# Patient Record
Sex: FEMALE | Race: WHITE | NOT HISPANIC OR LATINO | Employment: FULL TIME | URBAN - NONMETROPOLITAN AREA
[De-identification: names, ages, dates, MRNs, and addresses within clinical notes are randomized per-mention and may not be internally consistent; named-entity substitution may affect disease eponyms.]

---

## 2017-12-27 ENCOUNTER — HOSPITAL ENCOUNTER (EMERGENCY)
Facility: HOSPITAL | Age: 42
Discharge: HOME | End: 2017-12-27
Attending: EMERGENCY MEDICINE | Admitting: EMERGENCY MEDICINE

## 2017-12-27 ENCOUNTER — APPOINTMENT (OUTPATIENT)
Dept: GENERAL RADIOLOGY | Facility: HOSPITAL | Age: 42
End: 2017-12-27

## 2017-12-27 VITALS
HEIGHT: 68 IN | BODY MASS INDEX: 25.76 KG/M2 | OXYGEN SATURATION: 97 % | HEART RATE: 83 BPM | WEIGHT: 170 LBS | TEMPERATURE: 97.9 F | DIASTOLIC BLOOD PRESSURE: 85 MMHG | SYSTOLIC BLOOD PRESSURE: 121 MMHG | RESPIRATION RATE: 18 BRPM

## 2017-12-27 DIAGNOSIS — S63.91XA SPRAIN OF RIGHT HAND, INITIAL ENCOUNTER: Primary | ICD-10-CM

## 2017-12-27 DIAGNOSIS — S63.501A WRIST SPRAIN, RIGHT, INITIAL ENCOUNTER: ICD-10-CM

## 2017-12-27 PROCEDURE — 73110 X-RAY EXAM OF WRIST: CPT

## 2017-12-27 PROCEDURE — 99283 EMERGENCY DEPT VISIT LOW MDM: CPT

## 2017-12-27 PROCEDURE — 73130 X-RAY EXAM OF HAND: CPT

## 2017-12-27 PROCEDURE — 73090 X-RAY EXAM OF FOREARM: CPT

## 2017-12-27 RX ORDER — IBUPROFEN 800 MG/1
800 TABLET ORAL EVERY 8 HOURS PRN
Qty: 90 TABLET | Refills: 0 | Status: SHIPPED | OUTPATIENT
Start: 2017-12-27

## 2017-12-27 RX ORDER — IBUPROFEN 800 MG/1
800 TABLET ORAL ONCE
Status: COMPLETED | OUTPATIENT
Start: 2017-12-27 | End: 2017-12-27

## 2017-12-27 RX ADMIN — IBUPROFEN 800 MG: 800 TABLET, FILM COATED ORAL at 14:05

## 2020-12-23 ENCOUNTER — TRANSCRIBE ORDERS (OUTPATIENT)
Dept: ADMINISTRATIVE | Facility: HOSPITAL | Age: 45
End: 2020-12-23

## 2020-12-23 DIAGNOSIS — Z12.31 VISIT FOR SCREENING MAMMOGRAM: Primary | ICD-10-CM

## 2024-02-18 ENCOUNTER — TELEMEDICINE (OUTPATIENT)
Dept: FAMILY MEDICINE CLINIC | Facility: TELEHEALTH | Age: 49
End: 2024-02-18
Payer: COMMERCIAL

## 2024-02-18 DIAGNOSIS — M79.89 SWOLLEN FINGER: Primary | ICD-10-CM

## 2024-02-18 RX ORDER — IBUPROFEN 800 MG/1
800 TABLET ORAL EVERY 6 HOURS PRN
Qty: 45 TABLET | Refills: 0 | Status: SHIPPED | OUTPATIENT
Start: 2024-02-18

## 2024-02-18 RX ORDER — PREDNISONE 10 MG/1
TABLET ORAL
Qty: 21 TABLET | Refills: 0 | Status: SHIPPED | OUTPATIENT
Start: 2024-02-18

## 2024-02-18 RX ORDER — AMOXICILLIN AND CLAVULANATE POTASSIUM 875; 125 MG/1; MG/1
1 TABLET, FILM COATED ORAL 2 TIMES DAILY
Qty: 20 TABLET | Refills: 0 | Status: SHIPPED | OUTPATIENT
Start: 2024-02-18 | End: 2024-02-28

## 2024-03-04 ENCOUNTER — APPOINTMENT (OUTPATIENT)
Dept: GENERAL RADIOLOGY | Facility: HOSPITAL | Age: 49
End: 2024-03-04
Payer: COMMERCIAL

## 2024-03-04 ENCOUNTER — HOSPITAL ENCOUNTER (EMERGENCY)
Facility: HOSPITAL | Age: 49
Discharge: HOME | End: 2024-03-04
Attending: EMERGENCY MEDICINE | Admitting: EMERGENCY MEDICINE
Payer: COMMERCIAL

## 2024-03-04 VITALS
DIASTOLIC BLOOD PRESSURE: 100 MMHG | SYSTOLIC BLOOD PRESSURE: 133 MMHG | OXYGEN SATURATION: 95 % | BODY MASS INDEX: 36.88 KG/M2 | HEIGHT: 67 IN | TEMPERATURE: 98.1 F | HEART RATE: 87 BPM | RESPIRATION RATE: 17 BRPM | WEIGHT: 235 LBS

## 2024-03-04 DIAGNOSIS — L02.511 ABSCESS OF FINGER OF RIGHT HAND: Primary | ICD-10-CM

## 2024-03-04 PROCEDURE — 99283 EMERGENCY DEPT VISIT LOW MDM: CPT

## 2024-03-04 PROCEDURE — 73130 X-RAY EXAM OF HAND: CPT

## 2024-03-04 PROCEDURE — 63710000001 ONDANSETRON ODT 4 MG TABLET DISPERSIBLE

## 2024-03-04 RX ORDER — HYDROCODONE BITARTRATE AND ACETAMINOPHEN 5; 325 MG/1; MG/1
1 TABLET ORAL ONCE
Status: COMPLETED | OUTPATIENT
Start: 2024-03-04 | End: 2024-03-04

## 2024-03-04 RX ORDER — CEPHALEXIN 500 MG/1
500 CAPSULE ORAL 4 TIMES DAILY
Qty: 28 CAPSULE | Refills: 0 | Status: SHIPPED | OUTPATIENT
Start: 2024-03-04 | End: 2024-03-11

## 2024-03-04 RX ORDER — MELOXICAM 7.5 MG/1
7.5 TABLET ORAL DAILY
Qty: 7 TABLET | Refills: 0 | Status: SHIPPED | OUTPATIENT
Start: 2024-03-04 | End: 2024-03-11

## 2024-03-04 RX ORDER — SULFAMETHOXAZOLE AND TRIMETHOPRIM 800; 160 MG/1; MG/1
2 TABLET ORAL 2 TIMES DAILY
Qty: 28 TABLET | Refills: 0 | Status: SHIPPED | OUTPATIENT
Start: 2024-03-04 | End: 2024-03-07 | Stop reason: SDUPTHER

## 2024-03-04 RX ORDER — ONDANSETRON 4 MG/1
4 TABLET, ORALLY DISINTEGRATING ORAL ONCE
Status: COMPLETED | OUTPATIENT
Start: 2024-03-04 | End: 2024-03-04

## 2024-03-04 RX ADMIN — HYDROCODONE BITARTRATE AND ACETAMINOPHEN 1 TABLET: 5; 325 TABLET ORAL at 17:01

## 2024-03-04 RX ADMIN — ONDANSETRON 4 MG: 4 TABLET, ORALLY DISINTEGRATING ORAL at 17:01

## 2024-03-04 NOTE — ED PROVIDER NOTES
EMERGENCY DEPARTMENT ENCOUNTER    Pt Name: Sarina Bush  MRN: [id number]  Pt :   1975  Room Number:  01SF01  Date of encounter:  3/4/2024  PCP: Sarina Betancur APRN  ED Provider: Sulaiman Farley PA-C    Historian: Patient      HPI:  Chief Complaint:         Context: Sarina Bsuh is a 49 y.o. female who presents to the ED c/o pain and swelling in her right fifth digit.  Patient states for the last 2 weeks she has had a swollen area in the DIP joint of the right fifth digit that seems to be worsening.  She took a 10-day course of Augmentin which did not seem to help.  Patient denies any injury to the right hand.      PAST MEDICAL HISTORY  History reviewed. No pertinent past medical history.      PAST SURGICAL HISTORY  Past Surgical History:   Procedure Laterality Date    BACK SURGERY      discectomy    CHOLECYSTECTOMY      TUBAL ABDOMINAL LIGATION           FAMILY HISTORY  History reviewed. No pertinent family history.      SOCIAL HISTORY  Social History     Socioeconomic History    Marital status:    Tobacco Use    Smoking status: Every Day     Current packs/day: 0.50     Types: Cigarettes   Substance and Sexual Activity    Alcohol use: No    Drug use: Yes     Types: Marijuana         ALLERGIES  Codeine and Morphine and related        REVIEW OF SYSTEMS  Review of Systems   Skin:         Right finger swelling and redness          All systems reviewed and negative except for those discussed in HPI.       PHYSICAL EXAM    I have reviewed the triage vital signs and nursing notes.    ED Triage Vitals [24 1636]   Temp Heart Rate Resp BP SpO2   98.1 °F (36.7 °C) 87 17 133/100 95 %      Temp src Heart Rate Source Patient Position BP Location FiO2 (%)   Oral Monitor Sitting Left arm --       Physical Exam  Vitals and nursing note reviewed.   Constitutional:       General: She is not in acute distress.     Appearance: She is not ill-appearing, toxic-appearing or diaphoretic.   HENT:       Head: Normocephalic and atraumatic.      Mouth/Throat:      Mouth: Mucous membranes are moist.      Pharynx: Oropharynx is clear.   Eyes:      Extraocular Movements: Extraocular movements intact.   Cardiovascular:      Rate and Rhythm: Normal rate.      Heart sounds: Normal heart sounds.   Pulmonary:      Effort: Pulmonary effort is normal. No respiratory distress.      Breath sounds: Normal breath sounds.   Abdominal:      Tenderness: There is no abdominal tenderness.   Skin:     General: Skin is warm and dry.      Findings: No rash.          Neurological:      Mental Status: She is alert.           LAB RESULTS  No results found for this or any previous visit (from the past 24 hour(s)).    If labs were ordered, I independently reviewed the results and considered them in treating the patient.        RADIOLOGY  No Radiology Exams Resulted Within Past 24 Hours    I ordered and independently reviewed the above noted radiographic studies.      I viewed images of right hand x-ray which showed soft tissue swelling and concern for osteomyelitis of the DIP joint of the right fifth digit per my independent interpretation.    See radiologist's dictation for official interpretation.        PROCEDURES    Incision & Drainage    Date/Time: 3/4/2024 6:46 PM    Performed by: Sulaiman Farley PA-C  Authorized by: Dustin Funes DO    Consent:     Consent obtained:  Verbal    Consent given by:  Patient    Risks, benefits, and alternatives were discussed: yes      Risks discussed:  Bleeding, incomplete drainage, infection and pain    Alternatives discussed:  No treatment  Universal protocol:     Procedure explained and questions answered to patient or proxy's satisfaction: yes      Imaging studies available: yes      Patient identity confirmed:  Arm band  Location:     Type:  Abscess    Size:  2.6    Location:  Upper extremity    Upper extremity location:  Finger    Finger location:  R small finger  Pre-procedure  details:     Skin preparation:  Chlorhexidine with alcohol  Anesthesia:     Anesthesia method:  Local infiltration and nerve block    Local anesthetic:  Lidocaine 1% w/o epi    Block technique:  Ring block  Procedure type:     Complexity:  Simple  Procedure details:     Needle aspiration: yes      Needle size:  18 G    Incision types:  Stab incision    Incision depth:  Subcutaneous    Drainage:  Bloody and purulent    Drainage amount:  Scant    Wound treatment:  Wound left open  Post-procedure details:     Procedure completion:  Tolerated well, no immediate complications      No orders to display       MEDICATIONS GIVEN IN ER    Medications   ondansetron ODT (ZOFRAN-ODT) disintegrating tablet 4 mg (4 mg Oral Given 3/4/24 1701)   HYDROcodone-acetaminophen (NORCO) 5-325 MG per tablet 1 tablet (1 tablet Oral Given 3/4/24 1701)         MEDICAL DECISION MAKING, PROGRESS, and CONSULTS    All labs, if obtained, have been independently reviewed by me.  All radiology studies, if obtained, have been reviewed by me and the radiologist dictating the report.  All EKG's, if obtained, have been independently viewed and interpreted by me/my attending physician.      Discussion below represents my analysis of pertinent findings related to patient's condition, differential diagnosis, treatment plan and final disposition.    I personally reviewed the x-ray of the right hand and showed concern for soft tissue swelling with possible osteomyelitis of the DIP joint of the right fifth digit.  I discussed with ED attending Dr. Parry who recommended an I&D procedure and outpatient follow-up with orthopedic hand specialist within 24 to 48 hours.  I performed a incision and drainage procedure at bedside after performing a ring block of the right fifth digit.  A small amount of purulent drainage and some bloody drainage was expressed.  I then contacted hospital pharmacist at [facility] who recommended outpatient treatment for  osteo being Bactrim double strength 2 tablets twice daily for 7 days along with Keflex 500 mg 4 times daily for 7 days.  I arranged for patient to contact either of the orthopedic hand specialist available through the [employer] system she states she will call them tomorrow and try to follow-up within 24 to 48 hours.  Advised if she cannot follow-up with them to return to the ER for recheck within 48 hours.                     Differential diagnosis:    Differential diagnosis included was not limited to abscess, cellulitis, foreign body, osteomyelitis      Additional sources:    - Discussed/ obtained information from independent historians: Not applicable    - External (non-ED) record review: None    - Chronic or social conditions impacting care: None    - Shared decision making: Discussed risk versus benefit of admission for IV antibiotics versus outpatient follow-up and through shared decision making we agreed on the plan that she will take oral antibiotics and follow-up within 48 hours with hand specialist      Orders placed during this visit:  Orders Placed This Encounter   Procedures    XR Hand 3+ View Right         Additional orders considered but not ordered:  None    ED Course:    Consultants: Discussed with ED attending who agrees with plan of care                Shared Decision Making:  After my consideration of clinical presentation and any laboratory/radiology studies obtained, I discussed the findings with the patient/patient representative who is in agreement with the treatment plan and the final disposition.   Risks and benefits of discharge and/or observation/admission were discussed.       AS OF 18:45 EST VITALS:    BP - 133/100  HR - 87  TEMP - 98.1 °F (36.7 °C) (Oral)  O2 SATS - 95%                  DIAGNOSIS  Final diagnoses:   Abscess of finger of right hand         DISPOSITION  Discharge home      Please note that portions of this document were completed with voice recognition software.         Sulaiman Farley PA-C  03/04/24 1849

## 2024-03-04 NOTE — Clinical Note
[facility] EMERGENCY DEPARTMENT  [address] [address]  Phone: [phone]    Sarina Bush was seen and treated in our emergency department on 3/4/2024.  She may return to work on 03/08/2024.         Thank you for choosing [facility].    Sulaiman Farley PA-C

## 2024-03-04 NOTE — DISCHARGE INSTRUCTIONS
Please follow-up with orthopedic hand specialist soon as possible for further evaluation ideally within the next 24 to 48 hours.  Return to the ER for any acute changes or worsening of your condition.

## 2024-03-07 ENCOUNTER — LAB (OUTPATIENT)
Dept: LAB | Facility: HOSPITAL | Age: 49
End: 2024-03-07
Payer: COMMERCIAL

## 2024-03-07 ENCOUNTER — OFFICE VISIT (OUTPATIENT)
Age: 49
End: 2024-03-07
Payer: COMMERCIAL

## 2024-03-07 VITALS
BODY MASS INDEX: 36.53 KG/M2 | SYSTOLIC BLOOD PRESSURE: 140 MMHG | HEIGHT: 68 IN | DIASTOLIC BLOOD PRESSURE: 90 MMHG | WEIGHT: 241 LBS

## 2024-03-07 DIAGNOSIS — M00.9: ICD-10-CM

## 2024-03-07 DIAGNOSIS — M00.9: Primary | ICD-10-CM

## 2024-03-07 PROCEDURE — 87147 CULTURE TYPE IMMUNOLOGIC: CPT

## 2024-03-07 PROCEDURE — 87070 CULTURE OTHR SPECIMN AEROBIC: CPT

## 2024-03-07 PROCEDURE — 87186 SC STD MICRODIL/AGAR DIL: CPT

## 2024-03-07 PROCEDURE — 87205 SMEAR GRAM STAIN: CPT

## 2024-03-07 RX ORDER — SULFAMETHOXAZOLE AND TRIMETHOPRIM 800; 160 MG/1; MG/1
2 TABLET ORAL 2 TIMES DAILY
Qty: 28 TABLET | Refills: 0 | Status: SHIPPED | OUTPATIENT
Start: 2024-03-07 | End: 2024-03-14

## 2024-03-07 NOTE — PROGRESS NOTES
[facility] Orthopedic     Office Visit       Date: 03/07/2024   Patient Name: Sarina Bush  MRN: [id number]  YOB: 1975    Referring Physician: No ref. provider found     Chief Complaint:   Chief Complaint   Patient presents with   • Right Hand - Pain       History of Present Illness:   Sarina Bush is a 49 y.o. female dominant presents with right small finger pain of 3 weeks duration.  Patient denies inciting trauma.  She reports she is suddenly noticed increased pain and swelling of her right ankle DIP.  She reports slight tried to jeanna it at home and then presented to the ED on 3/4/2024.  She underwent bedside I&D at that time however they did not send culture.  She has been on Keflex and Bactrim since then with minimal improvement in her symptoms.  She reports pain with motion of the DIP.  Otherwise no pain proximally in the hand or finger.  Denies fever chills nausea or vomiting.  She has been doing soaks at home with some drainage from her dorsal DIP wound she is otherwise healthy.  She does have a history of nonspecific arthritis with morning arthralgias however has never been diagnosed with rheumatoid.      Subjective   Review of Systems:   Review of Systems   Constitutional: Negative.    HENT: Negative.     Eyes: Negative.    Respiratory: Negative.     Cardiovascular: Negative.    Gastrointestinal: Negative.    Endocrine: Negative.    Genitourinary: Negative.    Musculoskeletal:  Positive for arthralgias.   Skin: Negative.    Allergic/Immunologic: Negative.    Neurological: Negative.    Hematological: Negative.    Psychiatric/Behavioral: Negative.          Pertinent review of systems per HPI.     I reviewed the patient's chief complaint, history of present illness, review of systems, past medical history, surgical history, family history, social history, medications and allergy list in the EMR on 03/07/2024 and  "agree with the findings above.    Objective    Vital Signs:   Vitals:    03/07/24 1030   BP: 140/90   Weight: 109 kg (241 lb)   Height: 172.7 cm (68\")     BMI: Class 2 Severe Obesity (BMI >=35 and <=39.9). Obesity-related health conditions include the following: none. Obesity is unchanged. BMI is is above average; no BMI management plan is appropriate. We discussed portion control and increasing exercise.       General Appearance: No acute distress. Alert and oriented.     Chest:  Non-labored breathing on room air. Regular rate and rhythm.    Right upper Extremity Exam:    No palpable masses or visible lesions  Fingers are warm, well-perfused with appropriate capillary refill.  Palpable radial pulse.    Sensation intact to light touch in median, radial and ulnar nerve distributions.    Motor- Fires FPL, ulnar intrinsics, EPL/EDC w/ full active and passive range of motion. Strength intact.    Non-tender except for in the areas highlighted below    Right small finger DIP swollen and held in a partially flexed position.  There is localized erythema to the DIP.  He is tender to palpation.  There is a dorsal wound without underlying fluctuance.  There is pinpoint drainage after soaking the finger.  There is no cellulitis proximal to the DIP.  No tenderness along the flexor sheath.  Patient has pain with motion at the DIP but is otherwise nontender flexion extension of all digits of the hand.    Imaging/Studies:   Imaging Results (Last 24 Hours)       ** No results found for the last 24 hours. **            X-ray of the right hand from 3/4/2024 was independently reviewed and interpreted by myself.  There is clear arthritis with hook osteophytes about the DIP digits of the hand.  Pain attention to the small finger DIP in particular, there is soft tissue swelling as well as some osseous destruction and demineralization at the DIP joint concerning for septic arthritis.    Procedures:  Procedures    Quality Measures:   ACP: "   ACP discussion was declined by the patient, Patient does not have an advance directive, declines further assistance.    Tobacco:   Sarina Bush  reports that she has been smoking cigarettes. She started smoking about 29 years ago. She has a 14.9 pack-year smoking history. She has never used smokeless tobacco.      Assessment / Plan    Assessment/Plan:     There are no diagnoses linked to this encounter.     Sarina Bushis a 49 y.o. female who presents with:      ICD-10-CM ICD-9-CM   1. Septic arthritis of interphalangeal joint of finger  M00.9 711.04       Patient presents with 3-week history of right small finger DIP pain and swelling and exam concerning for septic arthritis of the DIP joint.  She previously underwent I&D with minimal expression of purulence.  She does have some scant drainage with soaks which were sent for culture today.  I will put in a referral for infectious disease w/ Dr. Chance for medical management of her right small finger DIP septic arthritis.  If she fails medical management she may later require amputation of the small finger.  Recommend patient continue antibiotics until then.  Recommend 3 times daily warm water soaks to encourage drainage.  I will also put in orders for CBC, BMP, ESR, CRP and wound culture.  Recommend patient follow-up with me in 2 weeks.    Follow Up:   Return in about 2 weeks (around 3/21/2024).        Taco Humphreys MD  [facility] Hand and Upper Extremity Surgeon

## 2024-03-08 ENCOUNTER — LAB (OUTPATIENT)
Dept: LAB | Facility: HOSPITAL | Age: 49
End: 2024-03-08
Payer: COMMERCIAL

## 2024-03-08 DIAGNOSIS — M00.9: ICD-10-CM

## 2024-03-08 LAB
ANION GAP SERPL CALCULATED.3IONS-SCNC: 11.2 MMOL/L (ref 5–15)
BASOPHILS # BLD AUTO: 0.05 10*3/MM3 (ref 0–0.2)
BASOPHILS NFR BLD AUTO: 0.6 % (ref 0–1.5)
BUN SERPL-MCNC: 14 MG/DL (ref 6–20)
BUN/CREAT SERPL: 17.7 (ref 7–25)
CALCIUM SPEC-SCNC: 8.9 MG/DL (ref 8.6–10.5)
CHLORIDE SERPL-SCNC: 102 MMOL/L (ref 98–107)
CO2 SERPL-SCNC: 22.8 MMOL/L (ref 22–29)
CREAT SERPL-MCNC: 0.79 MG/DL (ref 0.57–1)
CRP SERPL-MCNC: 0.81 MG/DL (ref 0–0.5)
DEPRECATED RDW RBC AUTO: 43 FL (ref 37–54)
EGFRCR SERPLBLD CKD-EPI 2021: 92.4 ML/MIN/1.73
EOSINOPHIL # BLD AUTO: 0.18 10*3/MM3 (ref 0–0.4)
EOSINOPHIL NFR BLD AUTO: 2.2 % (ref 0.3–6.2)
ERYTHROCYTE [DISTWIDTH] IN BLOOD BY AUTOMATED COUNT: 13.1 % (ref 12.3–15.4)
ERYTHROCYTE [SEDIMENTATION RATE] IN BLOOD: 9 MM/HR (ref 0–20)
GLUCOSE SERPL-MCNC: 98 MG/DL (ref 65–99)
HCT VFR BLD AUTO: 39 % (ref 34–46.6)
HGB BLD-MCNC: 13 G/DL (ref 12–15.9)
IMM GRANULOCYTES # BLD AUTO: 0.02 10*3/MM3 (ref 0–0.05)
IMM GRANULOCYTES NFR BLD AUTO: 0.2 % (ref 0–0.5)
LYMPHOCYTES # BLD AUTO: 1.93 10*3/MM3 (ref 0.7–3.1)
LYMPHOCYTES NFR BLD AUTO: 23.4 % (ref 19.6–45.3)
MCH RBC QN AUTO: 29.5 PG (ref 26.6–33)
MCHC RBC AUTO-ENTMCNC: 33.3 G/DL (ref 31.5–35.7)
MCV RBC AUTO: 88.4 FL (ref 79–97)
MONOCYTES # BLD AUTO: 0.69 10*3/MM3 (ref 0.1–0.9)
MONOCYTES NFR BLD AUTO: 8.4 % (ref 5–12)
NEUTROPHILS NFR BLD AUTO: 5.39 10*3/MM3 (ref 1.7–7)
NEUTROPHILS NFR BLD AUTO: 65.2 % (ref 42.7–76)
NRBC BLD AUTO-RTO: 0 /100 WBC (ref 0–0.2)
PLATELET # BLD AUTO: 281 10*3/MM3 (ref 140–450)
PMV BLD AUTO: 11.6 FL (ref 6–12)
POTASSIUM SERPL-SCNC: 4.3 MMOL/L (ref 3.5–5.2)
RBC # BLD AUTO: 4.41 10*6/MM3 (ref 3.77–5.28)
SODIUM SERPL-SCNC: 136 MMOL/L (ref 136–145)
WBC NRBC COR # BLD AUTO: 8.26 10*3/MM3 (ref 3.4–10.8)

## 2024-03-08 PROCEDURE — 86140 C-REACTIVE PROTEIN: CPT

## 2024-03-08 PROCEDURE — 85025 COMPLETE CBC W/AUTO DIFF WBC: CPT

## 2024-03-08 PROCEDURE — 36415 COLL VENOUS BLD VENIPUNCTURE: CPT

## 2024-03-08 PROCEDURE — 85652 RBC SED RATE AUTOMATED: CPT

## 2024-03-08 PROCEDURE — 80048 BASIC METABOLIC PNL TOTAL CA: CPT

## 2024-03-10 LAB
BACTERIA SPEC AEROBE CULT: ABNORMAL
GRAM STN SPEC: ABNORMAL
GRAM STN SPEC: ABNORMAL

## 2024-03-11 ENCOUNTER — TELEPHONE (OUTPATIENT)
Age: 49
End: 2024-03-11
Payer: COMMERCIAL

## 2024-03-11 NOTE — TELEPHONE ENCOUNTER
Received call from Jolly with  lab with critical result from pt's wound culture; Positive for MRSA; Relayed info to Dr. Humphreys.    Didi LANCE CMA ([facility]), ROT

## 2024-03-13 ENCOUNTER — TELEPHONE (OUTPATIENT)
Dept: ORTHOPEDIC SURGERY | Facility: CLINIC | Age: 49
End: 2024-03-13

## 2024-03-13 RX ORDER — KETOROLAC TROMETHAMINE 10 MG/1
10 TABLET, FILM COATED ORAL EVERY 6 HOURS PRN
Qty: 30 TABLET | Refills: 0 | Status: SHIPPED | OUTPATIENT
Start: 2024-03-13 | End: 2024-03-18

## 2024-03-13 NOTE — TELEPHONE ENCOUNTER
Provider: RAÚL    Caller: SUSIE    Relationship to Patient: SELF    Pharmacy: [facility]    Phone Number: [phone]    Reason for Call: PT CALLING TO SEE IF SHE CAN GET SOME KIND OF MEDICATION FOR THE PAIN SHE IS IN, MAX NOT WANT A NARCOTIC BUT SOMETHING STRONGER THAN WHAT SHE GOT LAST TIME

## 2024-03-21 ENCOUNTER — OFFICE VISIT (OUTPATIENT)
Age: 49
End: 2024-03-21
Payer: COMMERCIAL

## 2024-03-21 VITALS
SYSTOLIC BLOOD PRESSURE: 120 MMHG | HEIGHT: 68 IN | BODY MASS INDEX: 36.53 KG/M2 | WEIGHT: 241 LBS | DIASTOLIC BLOOD PRESSURE: 82 MMHG

## 2024-03-21 DIAGNOSIS — M00.9: Primary | ICD-10-CM

## 2024-03-21 PROCEDURE — 1160F RVW MEDS BY RX/DR IN RCRD: CPT | Performed by: PLASTIC SURGERY

## 2024-03-21 PROCEDURE — 1159F MED LIST DOCD IN RCRD: CPT | Performed by: PLASTIC SURGERY

## 2024-03-21 PROCEDURE — 99213 OFFICE O/P EST LOW 20 MIN: CPT | Performed by: PLASTIC SURGERY

## 2024-03-21 RX ORDER — KETOROLAC TROMETHAMINE 10 MG/1
10 TABLET, FILM COATED ORAL EVERY 6 HOURS PRN
COMMUNITY

## 2024-03-21 RX ORDER — SULFAMETHOXAZOLE AND TRIMETHOPRIM 800; 160 MG/1; MG/1
1 TABLET ORAL 2 TIMES DAILY
Qty: 28 TABLET | Refills: 0 | Status: SHIPPED | OUTPATIENT
Start: 2024-03-21 | End: 2024-04-04

## 2024-03-21 NOTE — PROGRESS NOTES
[facility] Orthopedic     Follow-up Office Visit       Date: 03/21/2024   Patient Name: Sarina Bush  MRN: [id number]  YOB: 1975    Chief Complaint:   Chief Complaint   Patient presents with    Follow-up     2 week follow up -- Septic arthritis of interphalangeal joint of finger       History of Present Illness:   Sarina Bush is a 49 y.o. female presents for follow-up of right small finger DIP arthritis.  She reports that since her last visit she has been having ongoing pain at the DIP.  Reports that the erythema and drainage has improved.  She has been taking antibiotics but ran out on antibiotics yesterday.  She has been unable to see an infectious disease specialist with [facility] due to her insurance.  We are attempting to get her follow-up with infectious disease specialist that [facility].  She reports she continues to have severe pain at the DIP.  Denies fevers chills nausea or vomiting.  Wound cultures from 2 weeks ago demonstrate MRSA.      Subjective   Review of Systems:   Review of Systems   Constitutional:  Negative for chills, fever, unexpected weight gain and unexpected weight loss.   HENT:  Negative for congestion, postnasal drip and rhinorrhea.    Eyes:  Negative for blurred vision.   Respiratory:  Negative for shortness of breath.    Cardiovascular:  Negative for leg swelling.   Gastrointestinal:  Negative for abdominal pain, nausea and vomiting.   Genitourinary:  Negative for difficulty urinating.   Musculoskeletal:  Positive for arthralgias. Negative for gait problem, joint swelling and myalgias.   Skin:  Negative for skin lesions and wound.   Neurological:  Negative for dizziness, weakness, light-headedness and numbness.   Hematological:  Does not bruise/bleed easily.   Psychiatric/Behavioral:  Negative for depressed mood.         Pertinent review of systems per  "HPI    I reviewed the patient's chief complaint, history of present illness, review of systems, past medical history, surgical history, family history, social history, medications and allergy list in the EMR on 03/21/2024 and agree with the findings above.    Objective    Vital Signs:   Vitals:    03/21/24 1003   BP: 120/82   Weight: 109 kg (241 lb)   Height: 172.7 cm (67.99\")     BMI: Class 2 Severe Obesity (BMI >=35 and <=39.9). Obesity-related health conditions include the following: none. Obesity is unchanged. BMI is is above average; no BMI management plan is appropriate. We discussed portion control and increasing exercise.       General Appearance: No acute distress. Alert and oriented.     Chest:  Non-labored breathing on room air      Right Upper Extremity:  Right small finger with swelling at the DIP.  Tender to palpation.  There is a dorsal wound with no purulent drainage today.  Erythema is improved from her last visit.  Nontender proximally on the finger.  Nontender over the flexor sheath.  Patient with limited range of motion at the DIP but otherwise intact.  Fingers warm and well-perfused distally  Sensation intact to light touch in the median, radial and ulnar nerve distributions    Imaging/Studies:   Imaging Results (Last 24 Hours)       ** No results found for the last 24 hours. **            No new imaging.    Procedures:  Procedures    Quality Measures:   Quality Measures:   ACP:   ACP discussion was declined by the patient, Patient does not have an advance directive, declines further assistance.    Tobacco:   Sarina Bush  reports that she has been smoking cigarettes. She started smoking about 29 years ago. She has a 14.9 pack-year smoking history. She has never used smokeless tobacco.    Assessment / Plan    Assessment/Plan:      Diagnosis Plan   1. Septic arthritis of interphalangeal joint of finger            Patient presents for follow-up of septic arthritis of right small finger " DIP.  She has evidence of MRSA on culture and has improved somewhat on oral Bactrim but has not been able to see an infectious disease specialist due to her insurance.  We will continue to try to get her follow-up with infectious disease at [facility].  Recommend continue p.o. Bactrim in the meantime.  If symptoms worsen or fail to improve with antibiotic she may eventually require debridement and possible amputation of the DIP however would like to manage medically and preserve the finger if possible.  Recommend patient continue out of work while she has an active infection.    Follow Up:   Return in about 2 weeks (around 4/4/2024).        Taco Humphreys MD  [facility] Hand and Upper Extremity Surgeon

## 2024-04-04 ENCOUNTER — OFFICE VISIT (OUTPATIENT)
Age: 49
End: 2024-04-04
Payer: COMMERCIAL

## 2024-04-04 VITALS
SYSTOLIC BLOOD PRESSURE: 124 MMHG | BODY MASS INDEX: 36.42 KG/M2 | DIASTOLIC BLOOD PRESSURE: 88 MMHG | HEIGHT: 68 IN | WEIGHT: 240.3 LBS

## 2024-04-04 DIAGNOSIS — M00.9: Primary | ICD-10-CM

## 2024-04-04 RX ORDER — SULFAMETHOXAZOLE AND TRIMETHOPRIM 800; 160 MG/1; MG/1
1 TABLET ORAL 2 TIMES DAILY
Qty: 42 TABLET | Refills: 0 | Status: SHIPPED | OUTPATIENT
Start: 2024-04-04 | End: 2024-04-25

## 2024-04-04 NOTE — PROGRESS NOTES
[facility] Orthopedic     Follow-up Office Visit       Date: 04/04/2024   Patient Name: Sarina Bush  MRN: [id number]  YOB: 1975    Chief Complaint:   Chief Complaint   Patient presents with    Follow-up     2 week follow up- Septic arthritis of interphalangeal joint of finger       History of Present Illness:   Sarina Bush is a 49 y.o. female presents for follow-up of septic arthritis of the right small finger.  She still has not been seen by infectious disease but does have an appointment with  infectious disease on April 24.  She reports that her right small finger pain and swelling has improved somewhat since her last visit.  She has continued to take Bactrim.  She denies fevers chills nausea or vomiting.  No new concerns.    Subjective   Review of Systems:   Review of Systems   Constitutional: Negative.  Negative for chills, fatigue and fever.   HENT: Negative.  Negative for congestion and dental problem.    Eyes: Negative.  Negative for blurred vision.   Respiratory: Negative.  Negative for shortness of breath.    Cardiovascular: Negative.  Negative for leg swelling.   Gastrointestinal: Negative.  Negative for abdominal pain.   Endocrine: Negative.  Negative for polyuria.   Genitourinary: Negative.  Negative for difficulty urinating.   Musculoskeletal:  Positive for arthralgias.   Skin: Negative.    Allergic/Immunologic: Negative.    Neurological: Negative.    Hematological: Negative.  Negative for adenopathy.   Psychiatric/Behavioral: Negative.  Negative for behavioral problems.         Pertinent review of systems per HPI    I reviewed the patient's chief complaint, history of present illness, review of systems, past medical history, surgical history, family history, social history, medications and allergy list in the EMR on 04/04/2024 and agree with the findings above.    Objective    Vital Signs:   Vitals:  "   04/04/24 [facility]   BP: 124/88   Weight: 109 kg (240 lb 4.8 oz)   Height: 172.7 cm (67.99\")     BMI: Class 2 Severe Obesity (BMI >=35 and <=39.9). Obesity-related health conditions include the following: none. Obesity is unchanged. BMI is is above average; no BMI management plan is appropriate. We discussed portion control and increasing exercise.       General Appearance: No acute distress. Alert and oriented.     Chest:  Non-labored breathing on room air      Right Upper Extremity:  Swelling of the right small finger DIP with the joint held in flexion.  Minimal active extensive at the joint.  Patient does have some flexion with only mild pain.  No erythema, fluctuance or induration.  She remains tender palpation over the joint however improved from prior visits.  No tenderness proximal to the DIP.  No evidence of felon or flexor tenosynovitis.  Patient has both flexion and extension of the rest of the finger without pain  Fingers warm and well-perfused distally  Sensation intact to light touch in the median, radial and ulnar nerve distributions    Imaging/Studies:   Imaging Results (Last 24 Hours)       ** No results found for the last 24 hours. **            No new imaging    Procedures:  Procedures    Quality Measures:   Quality Measures:   ACP:   ACP discussion was declined by the patient, Patient does not have an advance directive, declines further assistance.    Tobacco:   Sarina Bush  reports that she has been smoking cigarettes. She started smoking about 29 years ago. She has a 14.9 pack-year smoking history. She has been exposed to tobacco smoke. She has never used smokeless tobacco.    Assessment / Plan    Assessment/Plan:      Diagnosis Plan   1. Septic arthritis of interphalangeal joint of finger  CBC & Differential    Basic Metabolic Panel    C-reactive Protein    Sedimentation Rate    XR Finger 2+ View Right          Patient presents for follow-up of left small finger DIP MRSA septic " arthritis.  We still have not been able to get her established with infectious disease physician however she does have an appointment with  ID on April 24.  Her exam is continuing to improve slowly with p.o. Bactrim and she has no evidence of proximal spread of the infection.  Recommend repeat x-rays as well as labs including CRP ESR and CBC to evaluate her response to treatment.  Recommend continue Bactrim until appointment with infectious disease.  At that time they will determine whether she needs further IV antibiotics.  If she fails nonoperative management with antibiotics we will consider potentially debridement with masquelet technique however at this time we will continue with the antibiotics.  Follow-up with me on [id number] after her infectious disease appointment.    Follow Up:   Return in about 4 weeks (around 5/2/2024).        Taco Humphreys MD  [facility]remity Surgeon

## 2024-04-09 ENCOUNTER — HOSPITAL ENCOUNTER (OUTPATIENT)
Dept: GENERAL RADIOLOGY | Facility: HOSPITAL | Age: 49
Discharge: HOME | End: 2024-04-09
Payer: COMMERCIAL

## 2024-04-09 ENCOUNTER — LAB (OUTPATIENT)
Dept: LAB | Facility: HOSPITAL | Age: 49
End: 2024-04-09
Payer: COMMERCIAL

## 2024-04-09 DIAGNOSIS — M00.9: ICD-10-CM

## 2024-04-09 LAB
BASOPHILS # BLD AUTO: 0.05 10*3/MM3 (ref 0–0.2)
BASOPHILS NFR BLD AUTO: 0.6 % (ref 0–1.5)
DEPRECATED RDW RBC AUTO: 42.9 FL (ref 37–54)
EOSINOPHIL # BLD AUTO: 0.19 10*3/MM3 (ref 0–0.4)
EOSINOPHIL NFR BLD AUTO: 2.3 % (ref 0.3–6.2)
ERYTHROCYTE [DISTWIDTH] IN BLOOD BY AUTOMATED COUNT: 13.4 % (ref 12.3–15.4)
HCT VFR BLD AUTO: 40.9 % (ref 34–46.6)
HGB BLD-MCNC: 13.4 G/DL (ref 12–15.9)
IMM GRANULOCYTES # BLD AUTO: 0.01 10*3/MM3 (ref 0–0.05)
IMM GRANULOCYTES NFR BLD AUTO: 0.1 % (ref 0–0.5)
LYMPHOCYTES # BLD AUTO: 1.91 10*3/MM3 (ref 0.7–3.1)
LYMPHOCYTES NFR BLD AUTO: 22.8 % (ref 19.6–45.3)
MCH RBC QN AUTO: 28.8 PG (ref 26.6–33)
MCHC RBC AUTO-ENTMCNC: 32.8 G/DL (ref 31.5–35.7)
MCV RBC AUTO: 88 FL (ref 79–97)
MONOCYTES # BLD AUTO: 0.61 10*3/MM3 (ref 0.1–0.9)
MONOCYTES NFR BLD AUTO: 7.3 % (ref 5–12)
NEUTROPHILS NFR BLD AUTO: 5.6 10*3/MM3 (ref 1.7–7)
NEUTROPHILS NFR BLD AUTO: 66.9 % (ref 42.7–76)
NRBC BLD AUTO-RTO: 0 /100 WBC (ref 0–0.2)
PLATELET # BLD AUTO: 294 10*3/MM3 (ref 140–450)
PMV BLD AUTO: 11.1 FL (ref 6–12)
RBC # BLD AUTO: 4.65 10*6/MM3 (ref 3.77–5.28)
WBC NRBC COR # BLD AUTO: 8.37 10*3/MM3 (ref 3.4–10.8)

## 2024-04-09 PROCEDURE — 80048 BASIC METABOLIC PNL TOTAL CA: CPT

## 2024-04-09 PROCEDURE — 86140 C-REACTIVE PROTEIN: CPT

## 2024-04-09 PROCEDURE — 73140 X-RAY EXAM OF FINGER(S): CPT

## 2024-04-09 PROCEDURE — 85652 RBC SED RATE AUTOMATED: CPT

## 2024-04-09 PROCEDURE — 85025 COMPLETE CBC W/AUTO DIFF WBC: CPT

## 2024-04-10 LAB
ANION GAP SERPL CALCULATED.3IONS-SCNC: 12.7 MMOL/L (ref 5–15)
BUN SERPL-MCNC: 10 MG/DL (ref 6–20)
BUN/CREAT SERPL: 14.7 (ref 7–25)
CALCIUM SPEC-SCNC: 9 MG/DL (ref 8.6–10.5)
CHLORIDE SERPL-SCNC: 106 MMOL/L (ref 98–107)
CO2 SERPL-SCNC: 18.3 MMOL/L (ref 22–29)
CREAT SERPL-MCNC: 0.68 MG/DL (ref 0.57–1)
CRP SERPL-MCNC: 1.21 MG/DL (ref 0–0.5)
EGFRCR SERPLBLD CKD-EPI 2021: 107.6 ML/MIN/1.73
ERYTHROCYTE [SEDIMENTATION RATE] IN BLOOD: 26 MM/HR (ref 0–20)
GLUCOSE SERPL-MCNC: 113 MG/DL (ref 65–99)
POTASSIUM SERPL-SCNC: 4.5 MMOL/L (ref 3.5–5.2)
SODIUM SERPL-SCNC: 137 MMOL/L (ref 136–145)

## 2024-04-24 ENCOUNTER — OFFICE VISIT (OUTPATIENT)
Dept: ORTHOPEDIC SURGERY | Facility: CLINIC | Age: 49
End: 2024-04-24
Payer: COMMERCIAL

## 2024-04-24 VITALS
BODY MASS INDEX: 38.46 KG/M2 | WEIGHT: 253.8 LBS | DIASTOLIC BLOOD PRESSURE: 84 MMHG | SYSTOLIC BLOOD PRESSURE: 116 MMHG | HEIGHT: 68 IN

## 2024-04-24 DIAGNOSIS — M00.9: Primary | ICD-10-CM

## 2024-04-24 PROCEDURE — 99213 OFFICE O/P EST LOW 20 MIN: CPT | Performed by: PLASTIC SURGERY

## 2024-04-24 PROCEDURE — 1160F RVW MEDS BY RX/DR IN RCRD: CPT | Performed by: PLASTIC SURGERY

## 2024-04-24 PROCEDURE — 1159F MED LIST DOCD IN RCRD: CPT | Performed by: PLASTIC SURGERY

## 2024-04-24 NOTE — PROGRESS NOTES
[facility]     Follow-up Office Visit       Date: 04/24/2024   Patient Name: Sarina Bush  MRN: [id number]  YOB: 1975    Chief Complaint:   Chief Complaint   Patient presents with    Follow-up     2 week f/u -- Septic arthritis of interphalangeal joint of finger       History of Present Illness:   Sarina Bush is a 49 y.o. female presents for follow-up of septic arthritis of the DIP joint of the right small finger.  Ports her pain is somewhat improved her last visit but she still has pain at the small finger.  She was finally able to see [facility] today recommend discontinuing antibiotics and getting an MRI of the right small finger.  She denies fever chills nausea or vomiting.  Denies drainage from the small finger.  No other concerns.      Subjective   Review of Systems:   Review of Systems   Constitutional:  Negative for chills, fever, unexpected weight gain and unexpected weight loss.   HENT:  Negative for congestion, postnasal drip and rhinorrhea.    Eyes:  Negative for blurred vision.   Respiratory:  Negative for shortness of breath.    Cardiovascular:  Negative for leg swelling.   Gastrointestinal:  Negative for abdominal pain, nausea and vomiting.   Genitourinary:  Negative for difficulty urinating.   Musculoskeletal:  Positive for arthralgias. Negative for gait problem, joint swelling and myalgias.   Skin:  Negative for skin lesions and wound.   Neurological:  Negative for dizziness, weakness, light-headedness and numbness.   Hematological:  Does not bruise/bleed easily.   Psychiatric/Behavioral:  Negative for depressed mood.         Pertinent review of systems per HPI    I reviewed the patient's chief complaint, history of present illness, review of systems, past medical history, surgical history, family history, social history, medications and allergy list in the EMR on 04/24/2024  "and agree with the findings above.    Objective    Vital Signs:   Vitals:    04/24/24 1046   BP: 116/84   Weight: 115 kg (253 lb 12.8 oz)   Height: 172.7 cm (67.99\")     BMI: Body mass index is 38.6 kg/m².     General Appearance: No acute distress. Alert and oriented.     Chest:  Non-labored breathing on room air      Ortho Exam:  At the DIP of the small finger at the joint held in flexion.  No active extension of the joint.  Only mild tender to palpation over the small finger DIP.  Patient has full flexion of the hand.  No tenderness along the flexor tendon sheath.  No drainage or fluctuance associated with the DIP.  Fingers warm and well-perfused distally  Sensation intact to light touch in the median, radial and ulnar nerve distributions    Imaging/Studies:   Imaging Results (Last 24 Hours)       ** No results found for the last 24 hours. **            New imaging.    Procedures:  Procedures    Quality Measures:   ACP:   ACP discussion was deferred.    Tobacco:   Sarina Bush  reports that she has been smoking cigarettes. She started smoking about 29 years ago. She has a 15 pack-year smoking history. She has been exposed to tobacco smoke. She has never used smokeless tobacco.    Assessment / Plan    Assessment/Plan:      Diagnosis Plan   1. Septic arthritis of interphalangeal joint of finger            Patient presents for follow-up of right small finger DIP septic arthritis has slowly improved with p.o. antibiotics.  She was seen by infectious disease today who recommend trialing off antibiotics and an MRI of the right small finger.  Recommend patient follow-up with me in 3 weeks after MRI and trial off antibiotics.  Recommend strict return precautions if patient develops any increasing pain erythema or drainage.  Patient is in agreement with the plan.    Follow Up:   Return in about 3 weeks (around 5/15/2024).        Taco Humphreys MD  [facility] [place] [facility]  "

## 2024-04-25 ENCOUNTER — TELEPHONE (OUTPATIENT)
Age: 49
End: 2024-04-25
Payer: COMMERCIAL

## 2024-04-25 NOTE — TELEPHONE ENCOUNTER
Patient was finally able to get appointment at  but is unable to compete the MRI before her follow-up appointment with Dr Humphreys on 5/16/24. 's earliest opening to schedule her MRI was 6/10/24 but is on a waiting list in case someone cancels. Patient wanted Dr Humphreys to be aware of the this and did not know if she should cancel f/u with Petr or just wait.

## 2024-04-25 NOTE — TELEPHONE ENCOUNTER
I called patient and told her Dr. Humphreys said it is ok to keep the appointment and she understood.  Judith Street RT (R), ROT

## 2024-05-20 ENCOUNTER — ANESTHESIA EVENT (OUTPATIENT)
Dept: PERIOP | Facility: HOSPITAL | Age: 49
End: 2024-05-20
Payer: COMMERCIAL

## 2024-05-20 ENCOUNTER — OFFICE VISIT (OUTPATIENT)
Age: 49
End: 2024-05-20
Payer: COMMERCIAL

## 2024-05-20 VITALS
SYSTOLIC BLOOD PRESSURE: 130 MMHG | WEIGHT: 253 LBS | BODY MASS INDEX: 38.34 KG/M2 | DIASTOLIC BLOOD PRESSURE: 82 MMHG | HEIGHT: 68 IN

## 2024-05-20 DIAGNOSIS — M00.9: Primary | ICD-10-CM

## 2024-05-20 PROCEDURE — 1160F RVW MEDS BY RX/DR IN RCRD: CPT | Performed by: PLASTIC SURGERY

## 2024-05-20 PROCEDURE — 99214 OFFICE O/P EST MOD 30 MIN: CPT | Performed by: PLASTIC SURGERY

## 2024-05-20 PROCEDURE — 1159F MED LIST DOCD IN RCRD: CPT | Performed by: PLASTIC SURGERY

## 2024-05-20 RX ORDER — SODIUM CHLORIDE 0.9 % (FLUSH) 0.9 %
10 SYRINGE (ML) INJECTION AS NEEDED
Status: CANCELLED | OUTPATIENT
Start: 2024-05-20

## 2024-05-20 RX ORDER — DOXYCYCLINE HYCLATE 100 MG/1
100 CAPSULE ORAL
COMMUNITY
Start: 2024-05-15 | End: 2024-06-26

## 2024-05-20 RX ORDER — SODIUM CHLORIDE 0.9 % (FLUSH) 0.9 %
10 SYRINGE (ML) INJECTION EVERY 12 HOURS SCHEDULED
Status: CANCELLED | OUTPATIENT
Start: 2024-05-20

## 2024-05-20 RX ORDER — FAMOTIDINE 10 MG/ML
20 INJECTION, SOLUTION INTRAVENOUS ONCE
Status: CANCELLED | OUTPATIENT
Start: 2024-05-20 | End: 2024-05-20

## 2024-05-20 RX ORDER — SODIUM CHLORIDE 9 MG/ML
40 INJECTION, SOLUTION INTRAVENOUS AS NEEDED
Status: CANCELLED | OUTPATIENT
Start: 2024-05-20

## 2024-05-20 NOTE — H&P (VIEW-ONLY)
[facility]     Follow-up Office Visit       Date: 05/20/2024   Patient Name: Sarina Bush  MRN: [id number]  YOB: 1975    Chief Complaint:   Chief Complaint   Patient presents with    Follow-up     4 week follow up -- Septic arthritis of interphalangeal joint of finger       History of Present Illness:   Sarina Bush is a 49 y.o. female here for follow-up of right small finger DIP septic arthritis.  She has been following with [facility].  They trialed taking her off antibiotics and she developed increasing pain and swelling of the right small finger.  She was placed back on doxycycline approximately 5 days ago and recently received an MRI of the right small finger.  She reports her pain and swelling has improved slightly since being on the doxycycline however worsened while off antibiotics.  She now has pain in the entirety of her right small finger but is most prevalent at the DIP.  Reports pain with flexion of the small finger.  Denies fever chills nausea or vomiting.  No other concerns.      Subjective   Review of Systems:   Review of Systems   Constitutional:  Negative for chills, fever, unexpected weight gain and unexpected weight loss.   HENT:  Negative for congestion, postnasal drip and rhinorrhea.    Eyes:  Negative for blurred vision.   Respiratory:  Negative for shortness of breath.    Cardiovascular:  Negative for leg swelling.   Gastrointestinal:  Negative for abdominal pain, nausea and vomiting.   Genitourinary:  Negative for difficulty urinating.   Musculoskeletal:  Positive for arthralgias. Negative for gait problem, joint swelling and myalgias.   Skin:  Negative for skin lesions and wound.   Neurological:  Negative for dizziness, weakness, light-headedness and numbness.   Hematological:  Does not bruise/bleed easily.   Psychiatric/Behavioral:  Negative for depressed mood.      "    Pertinent review of systems per HPI    I reviewed the patient's chief complaint, history of present illness, review of systems, past medical history, surgical history, family history, social history, medications and allergy list in the EMR on 05/20/2024 and agree with the findings above.    Objective    Vital Signs:   Vitals:    05/20/24 1340   BP: 130/82   Weight: 115 kg (253 lb)   Height: 172.7 cm (67.99\")     BMI: Body mass index is 38.48 kg/m².     General Appearance: No acute distress. Alert and oriented.     Chest:  Non-labored breathing on room air      Ortho Exam:  Right small finger slightly swollen compared to prior exam.  Patient manage tender palpation of the DIP as well as mild tender to the PIP and MCP.  She does have some tenderness along the flexor sheath although is able to passively extend the small finger with minimal pain.  Mild erythema to small finger.  No tenderness in the hand or wrist.  Fingers warm and well-perfused distally  Sensation intact to light touch in the median, radial and ulnar nerve distributions    Imaging/Studies:   Imaging Results (Last 24 Hours)       ** No results found for the last 24 hours. **            MRI of the right small finger was independently viewed and interpreted by myself and demonstrate evidence of septic arthritis of the right small finger DIP as well as inflammation of the middle and distal phalanx with concern for early osteomyelitis.    Procedures:  Procedures    Quality Measures:   ACP:   ACP discussion was deferred.    Tobacco:   Sarina Bush  reports that she has been smoking cigarettes. She started smoking about 29 years ago. She has a 15 pack-year smoking history. She has been exposed to tobacco smoke. She has never used smokeless tobacco.    Assessment / Plan    Assessment/Plan:      Diagnosis Plan   1. Septic arthritis of interphalangeal joint of finger  Case Request          Patient presents for follow-up of right small finger DIP " arthritis.  She has not been on antibiotics per infectious disease recs and has now worsening of her exam after a trial off antibiotics.  Given these findings we recommend operative treatment of septic arthritis of the DIP.  Recommend incision and drainage of right small finger DIP, flexor sheath and all indicated procedures.  Will plan to book her for surgery tomorrow.  Repeat cultures of the time of surgery.  Will defer to [facility] with regards to antibiotic management the patient understands the risks and benefits of surgery and would like to proceed.    Consent-incision and drainage of right small finger DIP and flexor sheath, all indicated procedures    The risks and benefits of the procedure were discussed with the patient and or appropriate guardian, which include but are not limited to the risk of bleeding, infection, neurovascular damage, post-operative stiffness, recurrence, tendon and/or ligament retears, recurrent instability, continued pain, arthritic pain, need for further revision surgeries in the future, deep venous thrombosis, and general risks from anesthesia. We also discussed the post-operative rehabilitation, the need for physical therapy, and the overall expected outcomes from the procedure. We also discussed the possible use of biologics including allograft. We allowed proper time and answered the patient's questions regarding the procedure. The patient expressed understanding. Knowing what the risks are and what the conservative treatment is, the patient elected to forgo any further conservative treatment options and proceed with the surgical intervention.      Follow Up:   Return for Follow Up after Post Op.        Taco Humphreys MD  [facility] [place] [facility]

## 2024-05-20 NOTE — PROGRESS NOTES
[facility]     Follow-up Office Visit       Date: 05/20/2024   Patient Name: Sarina Bush  MRN: [id number]  YOB: 1975    Chief Complaint:   Chief Complaint   Patient presents with    Follow-up     4 week follow up -- Septic arthritis of interphalangeal joint of finger       History of Present Illness:   Sarina Bush is a 49 y.o. female here for follow-up of right small finger DIP septic arthritis.  She has been following with [facility].  They trialed taking her off antibiotics and she developed increasing pain and swelling of the right small finger.  She was placed back on doxycycline approximately 5 days ago and recently received an MRI of the right small finger.  She reports her pain and swelling has improved slightly since being on the doxycycline however worsened while off antibiotics.  She now has pain in the entirety of her right small finger but is most prevalent at the DIP.  Reports pain with flexion of the small finger.  Denies fever chills nausea or vomiting.  No other concerns.      Subjective   Review of Systems:   Review of Systems   Constitutional:  Negative for chills, fever, unexpected weight gain and unexpected weight loss.   HENT:  Negative for congestion, postnasal drip and rhinorrhea.    Eyes:  Negative for blurred vision.   Respiratory:  Negative for shortness of breath.    Cardiovascular:  Negative for leg swelling.   Gastrointestinal:  Negative for abdominal pain, nausea and vomiting.   Genitourinary:  Negative for difficulty urinating.   Musculoskeletal:  Positive for arthralgias. Negative for gait problem, joint swelling and myalgias.   Skin:  Negative for skin lesions and wound.   Neurological:  Negative for dizziness, weakness, light-headedness and numbness.   Hematological:  Does not bruise/bleed easily.   Psychiatric/Behavioral:  Negative for depressed mood.      "    Pertinent review of systems per HPI    I reviewed the patient's chief complaint, history of present illness, review of systems, past medical history, surgical history, family history, social history, medications and allergy list in the EMR on 05/20/2024 and agree with the findings above.    Objective    Vital Signs:   Vitals:    05/20/24 1340   BP: 130/82   Weight: 115 kg (253 lb)   Height: 172.7 cm (67.99\")     BMI: Body mass index is 38.48 kg/m².     General Appearance: No acute distress. Alert and oriented.     Chest:  Non-labored breathing on room air      Ortho Exam:  Right small finger slightly swollen compared to prior exam.  Patient manage tender palpation of the DIP as well as mild tender to the PIP and MCP.  She does have some tenderness along the flexor sheath although is able to passively extend the small finger with minimal pain.  Mild erythema to small finger.  No tenderness in the hand or wrist.  Fingers warm and well-perfused distally  Sensation intact to light touch in the median, radial and ulnar nerve distributions    Imaging/Studies:   Imaging Results (Last 24 Hours)       ** No results found for the last 24 hours. **            MRI of the right small finger was independently viewed and interpreted by myself and demonstrate evidence of septic arthritis of the right small finger DIP as well as inflammation of the middle and distal phalanx with concern for early osteomyelitis.    Procedures:  Procedures    Quality Measures:   ACP:   ACP discussion was deferred.    Tobacco:   Sarina Bush  reports that she has been smoking cigarettes. She started smoking about 29 years ago. She has a 15 pack-year smoking history. She has been exposed to tobacco smoke. She has never used smokeless tobacco.    Assessment / Plan    Assessment/Plan:      Diagnosis Plan   1. Septic arthritis of interphalangeal joint of finger  Case Request          Patient presents for follow-up of right small finger DIP " arthritis.  She has not been on antibiotics per infectious disease recs and has now worsening of her exam after a trial off antibiotics.  Given these findings we recommend operative treatment of septic arthritis of the DIP.  Recommend incision and drainage of right small finger DIP, flexor sheath and all indicated procedures.  Will plan to book her for surgery tomorrow.  Repeat cultures of the time of surgery.  Will defer to [facility] with regards to antibiotic management the patient understands the risks and benefits of surgery and would like to proceed.    Consent-incision and drainage of right small finger DIP and flexor sheath, all indicated procedures    The risks and benefits of the procedure were discussed with the patient and or appropriate guardian, which include but are not limited to the risk of bleeding, infection, neurovascular damage, post-operative stiffness, recurrence, tendon and/or ligament retears, recurrent instability, continued pain, arthritic pain, need for further revision surgeries in the future, deep venous thrombosis, and general risks from anesthesia. We also discussed the post-operative rehabilitation, the need for physical therapy, and the overall expected outcomes from the procedure. We also discussed the possible use of biologics including allograft. We allowed proper time and answered the patient's questions regarding the procedure. The patient expressed understanding. Knowing what the risks are and what the conservative treatment is, the patient elected to forgo any further conservative treatment options and proceed with the surgical intervention.      Follow Up:   Return for Follow Up after Post Op.        Taco Humphreys MD  [facility] [place] [facility]

## 2024-05-21 ENCOUNTER — ANESTHESIA (OUTPATIENT)
Dept: PERIOP | Facility: HOSPITAL | Age: 49
End: 2024-05-21
Payer: COMMERCIAL

## 2024-05-21 ENCOUNTER — HOSPITAL ENCOUNTER (OUTPATIENT)
Facility: HOSPITAL | Age: 49
Setting detail: HOSPITAL OUTPATIENT SURGERY
Discharge: HOME | End: 2024-05-21
Attending: PLASTIC SURGERY | Admitting: PLASTIC SURGERY
Payer: COMMERCIAL

## 2024-05-21 VITALS
TEMPERATURE: 98.2 F | WEIGHT: 253 LBS | HEART RATE: 85 BPM | BODY MASS INDEX: 39.71 KG/M2 | RESPIRATION RATE: 18 BRPM | HEIGHT: 67 IN | SYSTOLIC BLOOD PRESSURE: 148 MMHG | OXYGEN SATURATION: 93 % | DIASTOLIC BLOOD PRESSURE: 81 MMHG

## 2024-05-21 DIAGNOSIS — M00.9: Primary | ICD-10-CM

## 2024-05-21 PROCEDURE — 25010000002 LIDOCAINE 1 % SOLUTION 20 ML VIAL: Performed by: PLASTIC SURGERY

## 2024-05-21 PROCEDURE — 26080 EXPLORE/TREAT FINGER JOINT: CPT | Performed by: PLASTIC SURGERY

## 2024-05-21 PROCEDURE — 87176 TISSUE HOMOGENIZATION CULTR: CPT | Performed by: PLASTIC SURGERY

## 2024-05-21 PROCEDURE — 25010000002 PROPOFOL 10 MG/ML EMULSION: Performed by: ANESTHESIOLOGY

## 2024-05-21 PROCEDURE — 87102 FUNGUS ISOLATION CULTURE: CPT | Performed by: PLASTIC SURGERY

## 2024-05-21 PROCEDURE — 25010000002 ONDANSETRON PER 1 MG: Performed by: ANESTHESIOLOGY

## 2024-05-21 PROCEDURE — 87070 CULTURE OTHR SPECIMN AEROBIC: CPT | Performed by: PLASTIC SURGERY

## 2024-05-21 PROCEDURE — 87075 CULTR BACTERIA EXCEPT BLOOD: CPT | Performed by: PLASTIC SURGERY

## 2024-05-21 PROCEDURE — 87206 SMEAR FLUORESCENT/ACID STAI: CPT | Performed by: PLASTIC SURGERY

## 2024-05-21 PROCEDURE — 25010000002 VANCOMYCIN: Performed by: PLASTIC SURGERY

## 2024-05-21 PROCEDURE — 25010000002 DEXAMETHASONE PER 1 MG: Performed by: ANESTHESIOLOGY

## 2024-05-21 PROCEDURE — 87015 SPECIMEN INFECT AGNT CONCNTJ: CPT | Performed by: PLASTIC SURGERY

## 2024-05-21 PROCEDURE — 25010000002 BUPIVACAINE (PF) 0.25 % SOLUTION 10 ML VIAL: Performed by: PLASTIC SURGERY

## 2024-05-21 PROCEDURE — 87205 SMEAR GRAM STAIN: CPT | Performed by: PLASTIC SURGERY

## 2024-05-21 PROCEDURE — 87116 MYCOBACTERIA CULTURE: CPT | Performed by: PLASTIC SURGERY

## 2024-05-21 PROCEDURE — 25810000003 LACTATED RINGERS PER 1000 ML: Performed by: ANESTHESIOLOGY

## 2024-05-21 PROCEDURE — 25010000002 DROPERIDOL PER 5 MG

## 2024-05-21 PROCEDURE — 25010000002 FENTANYL CITRATE (PF) 100 MCG/2ML SOLUTION: Performed by: ANESTHESIOLOGY

## 2024-05-21 RX ORDER — OXYCODONE HYDROCHLORIDE 5 MG/1
5 TABLET ORAL EVERY 6 HOURS PRN
Qty: 20 TABLET | Refills: 0 | Status: SHIPPED | OUTPATIENT
Start: 2024-05-21 | End: 2024-06-03

## 2024-05-21 RX ORDER — ONDANSETRON 2 MG/ML
INJECTION INTRAMUSCULAR; INTRAVENOUS AS NEEDED
Status: DISCONTINUED | OUTPATIENT
Start: 2024-05-21 | End: 2024-05-21 | Stop reason: SURG

## 2024-05-21 RX ORDER — OXYCODONE HYDROCHLORIDE 5 MG/1
TABLET ORAL
Status: COMPLETED
Start: 2024-05-21 | End: 2024-05-21

## 2024-05-21 RX ORDER — FENTANYL CITRATE 50 UG/ML
INJECTION, SOLUTION INTRAMUSCULAR; INTRAVENOUS AS NEEDED
Status: DISCONTINUED | OUTPATIENT
Start: 2024-05-21 | End: 2024-05-21 | Stop reason: SURG

## 2024-05-21 RX ORDER — DROPERIDOL 2.5 MG/ML
0.62 INJECTION, SOLUTION INTRAMUSCULAR; INTRAVENOUS ONCE AS NEEDED
Status: COMPLETED | OUTPATIENT
Start: 2024-05-21 | End: 2024-05-21

## 2024-05-21 RX ORDER — PROPOFOL 10 MG/ML
VIAL (ML) INTRAVENOUS AS NEEDED
Status: DISCONTINUED | OUTPATIENT
Start: 2024-05-21 | End: 2024-05-21 | Stop reason: SURG

## 2024-05-21 RX ORDER — KETOROLAC TROMETHAMINE 10 MG/1
10 TABLET, FILM COATED ORAL EVERY 6 HOURS PRN
Qty: 30 TABLET | Refills: 0 | Status: SHIPPED | OUTPATIENT
Start: 2024-05-21

## 2024-05-21 RX ORDER — FAMOTIDINE 20 MG/1
20 TABLET, FILM COATED ORAL ONCE
Status: COMPLETED | OUTPATIENT
Start: 2024-05-21 | End: 2024-05-21

## 2024-05-21 RX ORDER — FENTANYL CITRATE 50 UG/ML
50 INJECTION, SOLUTION INTRAMUSCULAR; INTRAVENOUS
Status: DISCONTINUED | OUTPATIENT
Start: 2024-05-21 | End: 2024-05-21 | Stop reason: HOSPADM

## 2024-05-21 RX ORDER — DEXAMETHASONE SODIUM PHOSPHATE 10 MG/ML
INJECTION INTRAMUSCULAR; INTRAVENOUS AS NEEDED
Status: DISCONTINUED | OUTPATIENT
Start: 2024-05-21 | End: 2024-05-21 | Stop reason: SURG

## 2024-05-21 RX ORDER — SODIUM CHLORIDE, SODIUM LACTATE, POTASSIUM CHLORIDE, CALCIUM CHLORIDE 600; 310; 30; 20 MG/100ML; MG/100ML; MG/100ML; MG/100ML
9 INJECTION, SOLUTION INTRAVENOUS CONTINUOUS
Status: DISCONTINUED | OUTPATIENT
Start: 2024-05-21 | End: 2024-05-21 | Stop reason: HOSPADM

## 2024-05-21 RX ORDER — DROPERIDOL 2.5 MG/ML
INJECTION, SOLUTION INTRAMUSCULAR; INTRAVENOUS
Status: COMPLETED
Start: 2024-05-21 | End: 2024-05-21

## 2024-05-21 RX ORDER — MAGNESIUM HYDROXIDE 1200 MG/15ML
LIQUID ORAL AS NEEDED
Status: DISCONTINUED | OUTPATIENT
Start: 2024-05-21 | End: 2024-05-21 | Stop reason: HOSPADM

## 2024-05-21 RX ORDER — MIDAZOLAM HYDROCHLORIDE 1 MG/ML
1 INJECTION INTRAMUSCULAR; INTRAVENOUS
Status: DISCONTINUED | OUTPATIENT
Start: 2024-05-21 | End: 2024-05-21 | Stop reason: HOSPADM

## 2024-05-21 RX ORDER — LIDOCAINE HYDROCHLORIDE 10 MG/ML
0.5 INJECTION, SOLUTION EPIDURAL; INFILTRATION; INTRACAUDAL; PERINEURAL ONCE AS NEEDED
Status: COMPLETED | OUTPATIENT
Start: 2024-05-21 | End: 2024-05-21

## 2024-05-21 RX ADMIN — PROPOFOL 50 MG: 10 INJECTION, EMULSION INTRAVENOUS at 13:46

## 2024-05-21 RX ADMIN — PROPOFOL 50 MCG/KG/MIN: 10 INJECTION, EMULSION INTRAVENOUS at 13:43

## 2024-05-21 RX ADMIN — DROPERIDOL 0.62 MG: 2.5 INJECTION, SOLUTION INTRAMUSCULAR; INTRAVENOUS at 15:41

## 2024-05-21 RX ADMIN — OXYCODONE 5 MG: 5 TABLET ORAL at 15:43

## 2024-05-21 RX ADMIN — LIDOCAINE HYDROCHLORIDE 0.5 ML: 10 INJECTION, SOLUTION EPIDURAL; INFILTRATION; INTRACAUDAL; PERINEURAL at 12:20

## 2024-05-21 RX ADMIN — SODIUM CHLORIDE, POTASSIUM CHLORIDE, SODIUM LACTATE AND CALCIUM CHLORIDE 9 ML/HR: 600; 310; 30; 20 INJECTION, SOLUTION INTRAVENOUS at 12:21

## 2024-05-21 RX ADMIN — FAMOTIDINE 20 MG: 20 TABLET, FILM COATED ORAL at 12:00

## 2024-05-21 RX ADMIN — ONDANSETRON 4 MG: 2 INJECTION INTRAMUSCULAR; INTRAVENOUS at 14:04

## 2024-05-21 RX ADMIN — DEXAMETHASONE SODIUM PHOSPHATE 4 MG: 10 INJECTION INTRAMUSCULAR; INTRAVENOUS at 14:04

## 2024-05-21 RX ADMIN — FENTANYL CITRATE 100 MCG: 50 INJECTION, SOLUTION INTRAMUSCULAR; INTRAVENOUS at 13:43

## 2024-05-21 RX ADMIN — Medication 1750 MG: at 12:41

## 2024-05-21 NOTE — DISCHARGE INSTRUCTIONS
[facility]  [address], [address]  [place] [address]  [phone]    Discharge Instructions for:  Right small finger incision and drainage    Follow-up Instructions  Date:  ________1 Week__________________      Wound Care   Remove your surgical dressing on post-operative day 3 and then soak incision 3 times daily and place a band-aid over incision.  Keep dressing clean, dry and intact until then.  Your hand will swell after surgery and your dressing may begin to feel tight.  IF you feel the post op dressing is too tight, please loosen the external ace wrap.    While showering place a large plastic bag over the arm and securely taped just below the shoulder. Keeping the hand elevated during the shower will prevent the bandages from getting wet.  After removing your dressing, you may run clean water over your incision but avoid submerging underwater.  Keep your incision clean until your sutures are removed.   Sutures will be removed during your first post-operative appointment (10-14 days after surgery).     Activity  Elevate the operative hand above your heart, fingers pointing up, as much as possible for 72 hours to help decrease post-operative swelling.   To prevent swelling and stiffness make a full fist and fully straighten the fingers 8-10 times an hour while awake.   You may use the hand for light activities such as eating, dressing, and personal care.   Avoid pushing / pulling / or lifting more than 5 lbs (about half a gallon of milk) with the operative hand.   Use the extremity as pain allows in the postoperative period.  Overuse or exercise during this period will lead to increased discomfort and should be avoided.    Diet  Resume your normal diet when you feel well. If your stomach is upset, try bland, low-fat foods like plain toast, chicken broth, rice, and yogurt. Continue to drink plenty of fluids.    Many people are constipated after surgery. This  can be due to the pain medicine and a lack of activity. Be sure you get plenty of fluids and take a fiber supplement such as Citrucel or Metamucil or a stool softener like Colace.     Medications  Please take 650 mg of acetaminophen (Tylenol) every 6 hours.  You have also been prescribed toradol. Take this in place of ibuprofen.  Please continue all medications that you were taking prior to your surgery in addition to the medications prescribed for post-operative pain. If you have questions regarding those medications that you were on prior to surgery, please contact your primary care physician.     When to call for help    Call 911 anytime you think you may need emergency care. For example, call if:   You pass out (lose consciousness).   You have severe trouble breathing.   You have sudden chest pain and shortness of breath, or you cough up blood.   You have severe nausea or vomiting.      Call the office at [phone] if:  You have pain that does not go away after you take pain pills.   You have a fever over 100.4°F.   You have loose stitches, or your incision comes open.   Your incision keeps bleeding 3 days after your surgery.   You have signs of infection, such as redness around incision or pus draining from your incision.

## 2024-05-21 NOTE — OP NOTE
DATE OF PROCEDURE: 05/21/2024    LOCATION: [facility]     PROCEDURES PERFORMED:    Incision and drainage, arthrotomy of right small finger DIP CPT 56007    SURGEON: Taco Humphreys MD      ASSISTANTS:    1. None        Circulator: Rg Locke RN  Scrub Person: Oseas Camdenneda      ANESTHESIA: Mac w/ local    PREOPERATIVE DIAGNOSES:  Septic arthritis right small finger DIP     POSTOPERATIVE DIAGNOSES:    Same     ESTIMATED BLOOD LOSS: 1 mL.    SPECIMENS: Right small finger DIP tissue, right small finger middle phalanx bone for culture    IMPLANTS: None    COMPLICATIONS: None     INDICATIONS:  Sarina Bush is a 49 y.o. female who initially presented with right small finger DIP septic arthritis.  She failed nonoperative management with soaks and antibiotics and has had worsening of her exam after infectious diseases trialed her off antibiotics.  MRI demonstrated evidence of persistent septic arthritis of the small finger DIP as well as enhancement concerning for early osteomyelitis.  Because of this the decision was made to take her back to the operating room for formal incision and drainage of the right small finger.  The risks, benefits, alternatives and potential complications of surgery were discussed with the patient including but not limited to bleeding scarring, infection, recurrence, stiffness, damage to surrounding structures or postoperative pain.  The patient understands the risks and agreed to proceed with surgery.  A surgical informed consent was signed prior to the procedure.     DESCRIPTION OF PROCEDURE:      The patient was greeted in the pre-operative holding area and the surgical site was marked and consent confirmed prior to bringing the patient to the operating room.  The patient was then taken to the operating room and a timeout was performed including the patient's name, procedure and antibiotic administration prior to the patient receiving [facility].  The  patient was positioned supine on the operative room table and a digital block of the right small finger was performed with combination of 1% lidocaine and 0.5% Marcaine. It was then prepped and draped in the usual sterile fashion.  The patient received the appropriate antibiotics within 1 hour of skin incision.     The tourniquet was applied to the right small finger.  Stepwise incision was made then, centered over the dorsal extension crease of the small finger DIP.  The incision was taken through subcutaneous tissue and skin flaps were then elevated off of the underlying terminal extensor tendon, which was found to be attenuated but in continuity. This was retracted and an arthrotomy was then made in the DIP joint.  There was findings of chronic synovitis but no gross purulence.  A sample of the synovial tissue was then harvested and sent for tissue cultures.  The wound was then irrigated with copious amounts normal saline solution.  A separate sterile rongeur was then used to harvest a small section of bone from the proximal phalanx head.  The wound was then irrigated with copious amounts normal saline solution.  Skin was closed loosely with 5-0 nylon suture in simple interrupted fashion.  An AlumaFoam splint dressing was applied to the finger.    At the end of the procedure the patient was awoken from anesthesia and transferred to the PACU in stable condition.  I participated in all parts of the case.       POSTOPERATIVE PLAN:  Dressing down in 3 days.  Start 3 times daily warm water soaks after dressing down.  Continue antibiotics per infectious disease.  As needed oxycodone, Toradol and standing Tylenol for pain.  Follow-up in 1 week for wound check and suture removal.

## 2024-05-21 NOTE — ANESTHESIA POSTPROCEDURE EVALUATION
Patient: Sarina Bush    Procedure Summary       Date: 05/21/24 Room / Location:  [facility] /  [facility]    Anesthesia Start: 1339 Anesthesia Stop: 1433    Procedure: INCISION / DRAINAGE HAND / FINGER (Right: Hand) Diagnosis:       Septic arthritis of interphalangeal joint of finger      (Septic arthritis of interphalangeal joint of finger [M00.9])    Surgeons: Taco Humphreys MD Provider: Robbie Judge Jr., MD    Anesthesia Type: MAC ASA Status: 2            Anesthesia Type: MAC    Vitals  Vitals Value Taken Time   /81 05/21/24 1530   Temp 98.2 °F (36.8 °C) 05/21/24 1432   Pulse 85 05/21/24 1530   Resp 18 05/21/24 1530   SpO2 93 % 05/21/24 1530           Post Anesthesia Care and Evaluation    Patient location during evaluation: PACU  Patient participation: complete - patient participated  Level of consciousness: awake and alert  Pain management: adequate    Airway patency: patent  Anesthetic complications: No anesthetic complications  PONV Status: none  Cardiovascular status: hemodynamically stable and acceptable  Respiratory status: nonlabored ventilation, acceptable and nasal cannula  Hydration status: acceptable         54 M with HTN, HLD, CAD s/p MI and PCI, asthma, MARYAM on nocturnal CPAP, GERD, Crohn's disease s/p stricturoplasty x 2 and small bowel resection in 3/2018, now p/w acute onset of abdominal pain, found to have a SBO with a transition point in the RLQ, current being conservatively managed with NGT decompression and bowel rest.     1. Asthma:  - Currently stable pulmonary status, saturating well on RA.   - No evidence of an asthma exacerbation  - Suggest Duonebs Q6H prn  - Can continue Symbicort BID and Spiriva daily  - Ok to hold Singulair as pt is NPO  - Keep O2 sats above 90 %  - Incentive spirometry once able to tolerate     2. MARYAM:  - Pt is on nocturnal APAP with a min pressure of 4 and max pressure of 9 at home   - Ok to hold off on nocturnal CPAP in the setting of an acute bowel obstruction   - Once acute issues have resolved and there is no longer a contraindication to NIV, we can resume nocturnal CPAP at 9 cm H20    3. Small bowel obstruction/ Crohn's disease with exacerbation/ SB stricture   - Currently NPO and receiving NGT decompression per surgical team   - On IV Cipro/ flagyl   - Cont IV hydration   - Serial abdominal exams   - GI evalv    4. Gen:  - DVT Px: Hep SQ  - Dr. Duckworth will follow pt during hospitalization 54 M with HTN, HLD, CAD s/p MI and PCI, asthma, MARYAM on nocturnal CPAP, GERD, Crohn's disease s/p stricturoplasty x 2 and small bowel resection in 3/2018, now p/w acute onset of abdominal pain, found to have a SBO with a transition point in the RLQ, current being conservatively managed with NGT decompression and bowel rest.     1. Asthma:  - Currently stable pulmonary status, saturating well on RA.   - No evidence of an asthma exacerbation  - Suggest Duonebs Q6H prn  - continue Symbicort BID and Spiriva daily  - Ok to hold Singulair as pt is NPO  - Keep O2 sats above 90 %  - Incentive spirometry once able to tolerate     2. MARYAM:  - Pt is on nocturnal APAP with a min pressure of 4 and max pressure of 9 at home   - Ok to hold off on nocturnal CPAP in the setting of an acute bowel obstruction   - Once acute issues have resolved and there is no longer a contraindication to NIV, we can resume nocturnal CPAP at 9 cm H20    3. Small bowel obstruction/ Crohn's disease with exacerbation/ SB stricture   - Currently NPO and receiving NGT decompression per surgical team   - On IV Cipro/ flagyl   - Cont IV hydration   - Serial abdominal exams   - GI evaln    4. Gen:  - DVT Px: Hep SQ

## 2024-05-21 NOTE — ANESTHESIA PREPROCEDURE EVALUATION
Anesthesia Evaluation     Patient summary reviewed and Nursing notes reviewed   no history of anesthetic complications:   NPO Solid Status: > 8 hours  NPO Liquid Status: > 2 hours           Airway   Mallampati: II  TM distance: >3 FB  Neck ROM: full  Possible difficult intubation  Dental      Pulmonary - normal exam   (+) a smoker Current,  Cardiovascular - normal exam  Exercise tolerance: good (4-7 METS)    (-) dysrhythmias, angina, cardiac stents, CABG      Neuro/Psych  (+) numbness  (-) seizures, neuromuscular disease, TIA  GI/Hepatic/Renal/Endo    (+) obesity  (-) GERD, no renal disease, diabetes, no thyroid disorder    Musculoskeletal     Abdominal    Substance History   (+) drug use (MJ)  (-) alcohol use     OB/GYN          Other        ROS/Med Hx Other: Hgb 13.2 k 4.5   No n/v/gerd/glp1      Phys Exam Other: Slight retrognathia                  Anesthesia Plan    ASA 2     MAC     (Risks and benefits of general anesthesia discussed with patient (including MI, CVA, death, recall, aspiration, oropharyngeal/dental damage), questions answered, agreeable to proceed.   Benefits and risks of possible nerve block/catheter discussed with patient ((including failed block, damage to nerve/nearby structures, intravascular injection)); questions answered, agreeable to proceed.        )  intravenous induction     Anesthetic plan, risks, benefits, and alternatives have been provided, discussed and informed consent has been obtained with: patient.    Plan discussed with CRNA.        CODE STATUS:

## 2024-05-21 NOTE — INTERVAL H&P NOTE
"[facility] Pre-op    Full history and physical note from office is attached.    /82 (BP Location: Right arm, Patient Position: Lying)   Pulse 74   Temp 97.2 °F (36.2 °C) (Temporal)   Resp 16   Ht 170.2 cm (67\")   Wt 115 kg (253 lb)   LMP 05/06/2024 (Approximate)   SpO2 95%   BMI 39.63 kg/m²     Lungs: Clear to auscultation bilaterally, respirations unlabored  Heart:   Regular rate and rhythm, S1 and S2 normal    LAB Results:  Lab Results   Component Value Date    WBC 10.21 05/15/2024    HGB 13.2 05/15/2024    HCT 42.0 05/15/2024    MCV 93 05/15/2024     05/15/2024    NEUTROABS 6.85 (H) 05/15/2024    GLUCOSE 113 (H) 04/09/2024    BUN 10 04/09/2024    CREATININE 0.68 04/09/2024     04/09/2024    K 4.5 04/09/2024     04/09/2024    CO2 18.3 (L) 04/09/2024    CALCIUM 9.0 04/09/2024     MRI Hand Right With & Without Contrast  Order: [id number]  Impression    There is edema and enhancement in the nailbed of the fifth digit with further dorsal subcutaneous edema concerning for soft tissue infection. There is further destruction of the distal interphalangeal joint of the fifth digit with effusion which may represent septic joint. There is further edema like signal change throughout the middle and distal phalanx of the fifth digit with loss of T1 signal and associated enhancement consistent with osteomyelitis.    In the partially visualized third digit there is mild edema along the proximal interphalangeal joint with edema and cystic change in the middle phalanx along the joint space, this could be in part artifactual as this is at the margin of the study however there is likely some degree of inflammation in this region. This could be due to arthropathy however correlate clinically to exclude a component of cellulitis/infection in this region.    CRITICAL RESULT:    No.    COMMUNICATION:  Per this written report.      Drafted by Prashant Hayden on 5/16/2024 2:29 PM  Final report " signed by Prashant Hayden on 5/16/2024 2:36 PM  Narrative    CLINICAL INDICATION:  Osteomyelitis suspected, hand, xray done    TECHNIQUE:  Multiplanar multiecho sequences were obtained utilizing T1 and T2 weighting with and without the administration of intravenous contrast.  11.4 mL of Gadavist was administered.    COMPARISON:  None.    FINDINGS:  Bone and Bone Marrow: There is irregularity of the base of the distal phalanx of the fifth digit possibly representing component of erosive change. There is edema-like marrow change throughout the middle phalanx and distal phalanx of the fifth digit with associated T1 signal change and enhancement. There is partially visualized mild edema and cystic change along the proximal interphalangeal joint of the third digit.    Soft Tissues: There is edema muscle dorsally along the distal aspect of the fifth digit extending to the distal interphalangeal joint with associated enhancement. There is enhancement and edema undercutting the fifth digit nailbed. There is mild edema along the proximal phalangeal joint of the third digit. The flexor and extensor grossly intact. No evidence of tenosynovitis.  Exam End: 05/16/24 14:04     Cancer Staging (if applicable)  Cancer Patient: __ yes __no __unknown__N/A; If yes, clinical stage T:__ N:__M:__, stage group or __N/A      Impression: Septic arthritis of interphalangeal joint of finger       Plan: INCISION / DRAINAGE HAND / FINGER      Janessa Delgado, ELLIS   5/21/2024   12:07 EDT

## 2024-05-24 LAB
BACTERIA SPEC AEROBE CULT: NORMAL
BACTERIA SPEC AEROBE CULT: NORMAL
GRAM STN SPEC: NORMAL
GRAM STN SPEC: NORMAL

## 2024-05-26 LAB
BACTERIA SPEC ANAEROBE CULT: NORMAL
BACTERIA SPEC ANAEROBE CULT: NORMAL
FUNGUS WND CULT: NORMAL
FUNGUS WND CULT: NORMAL
MYCOBACTERIUM SPEC CULT: NORMAL
MYCOBACTERIUM SPEC CULT: NORMAL
NIGHT BLUE STAIN TISS: NORMAL
NIGHT BLUE STAIN TISS: NORMAL

## 2024-05-28 LAB
FUNGUS WND CULT: NORMAL
FUNGUS WND CULT: NORMAL
MYCOBACTERIUM SPEC CULT: NORMAL
MYCOBACTERIUM SPEC CULT: NORMAL
NIGHT BLUE STAIN TISS: NORMAL
NIGHT BLUE STAIN TISS: NORMAL

## 2024-05-30 ENCOUNTER — TELEPHONE (OUTPATIENT)
Dept: ORTHOPEDIC SURGERY | Facility: CLINIC | Age: 49
End: 2024-05-30
Payer: COMMERCIAL

## 2024-05-30 RX ORDER — SULFAMETHOXAZOLE AND TRIMETHOPRIM 800; 160 MG/1; MG/1
TABLET ORAL
Qty: 42 TABLET | Refills: 0 | OUTPATIENT
Start: 2024-05-30

## 2024-05-30 NOTE — TELEPHONE ENCOUNTER
PATIENT'S 1ST POST OPERATION APPOINTMENT HAS BEEN RESCHEDULED FROM TODAY AT 9:50 TO MONDAY ELISHA 3 AT 11:30.

## 2024-06-03 ENCOUNTER — OFFICE VISIT (OUTPATIENT)
Age: 49
End: 2024-06-03
Payer: COMMERCIAL

## 2024-06-03 VITALS — TEMPERATURE: 97.7 F

## 2024-06-03 DIAGNOSIS — Z98.890 POST-OPERATIVE STATE: Primary | ICD-10-CM

## 2024-06-03 PROCEDURE — 1159F MED LIST DOCD IN RCRD: CPT | Performed by: PLASTIC SURGERY

## 2024-06-03 PROCEDURE — 99024 POSTOP FOLLOW-UP VISIT: CPT | Performed by: PLASTIC SURGERY

## 2024-06-03 PROCEDURE — 1160F RVW MEDS BY RX/DR IN RCRD: CPT | Performed by: PLASTIC SURGERY

## 2024-06-03 NOTE — PROGRESS NOTES
[facility] Orthopedic     Follow-up Office Visit       Date: 06/03/2024   Patient Name: Sarina Bush  MRN: [id number]  YOB: 1975    Chief Complaint:   Chief Complaint   Patient presents with    Post-op     13 days status post INCISION DRAINAGE RIGHT FINGER 5/21/24       History of Present Illness:   Sarina Bush is a 49 y.o. female presents for follow-up status post right small finger DIP I&D on 5/21/2024.  Patient reports she has had significant improvement in her right small finger pain since surgery.  Denies erythema drainage, fevers chills nausea or vomiting.  Has been on Bactrim per infectious diseases at .  No other concerns.      Subjective   Review of Systems:   Review of Systems   Constitutional: Negative.    HENT: Negative.     Eyes: Negative.    Respiratory: Negative.     Cardiovascular: Negative.    Gastrointestinal: Negative.    Endocrine: Negative.    Genitourinary: Negative.    Musculoskeletal:  Positive for arthralgias.   Skin: Negative.    Allergic/Immunologic: Negative.    Neurological: Negative.    Hematological: Negative.    Psychiatric/Behavioral: Negative.          Pertinent review of systems per HPI    I reviewed the patient's chief complaint, history of present illness, review of systems, past medical history, surgical history, family history, social history, medications and allergy list in the EMR on 06/03/2024 and agree with the findings above.    Objective    Vital Signs:   Vitals:    06/03/24 1130   Temp: 97.7 °F (36.5 °C)     BMI: There is no height or weight on file to calculate BMI.     General Appearance: No acute distress. Alert and oriented.     Chest:  Non-labored breathing on room air      Ortho Exam:  Small finger nontender throughout the entirety of including over the DIP PIP and MCP.  Full flexion and extension of the right small finger except for a 30 degree extensor lag at  the DIP.  Incision is clean dry intact and healing appropriately.  Fingers warm and well-perfused distally  Sensation intact to light touch in the median, radial and ulnar nerve distributions    Imaging/Studies:   Imaging Results (Last 24 Hours)       ** No results found for the last 24 hours. **            Procedures:  Procedures    Quality Measures:   ACP:   ACP discussion was deferred.    Tobacco:   Sarina Bush  reports that she has been smoking cigarettes. She started smoking about 30 years ago. She has a 15 pack-year smoking history. She has been exposed to tobacco smoke. She has never used smokeless tobacco.    Assessment / Plan    Assessment/Plan:      Diagnosis Plan   1. Post-operative state            Patient presents 2-week status post right small finger DIP I&D for septic arthritis.  Well as significant relief of her preoperative pain.  Operative cultures no growth to date.  Patient continues to be on oral Bactrim.  Infectious diseases.  Recommend continue active and passive range of motion and home exercise program for right small finger range of motion.  Recommend patient follow-up with me in 6 weeks for repeat check.    Follow Up:   Return in about 6 weeks (around 7/15/2024).        Taco Humphreys MD  [facility] Hand and Upper Extremity Surgeon

## 2025-03-24 ENCOUNTER — APPOINTMENT (OUTPATIENT)
Dept: GENERAL RADIOLOGY | Facility: HOSPITAL | Age: 50
End: 2025-03-24
Payer: COMMERCIAL

## 2025-03-24 ENCOUNTER — HOSPITAL ENCOUNTER (EMERGENCY)
Facility: HOSPITAL | Age: 50
Discharge: HOME | End: 2025-03-24
Attending: EMERGENCY MEDICINE | Admitting: EMERGENCY MEDICINE
Payer: COMMERCIAL

## 2025-03-24 VITALS
RESPIRATION RATE: 16 BRPM | HEIGHT: 67 IN | WEIGHT: 235 LBS | DIASTOLIC BLOOD PRESSURE: 78 MMHG | BODY MASS INDEX: 36.88 KG/M2 | HEART RATE: 78 BPM | OXYGEN SATURATION: 96 % | TEMPERATURE: 98 F | SYSTOLIC BLOOD PRESSURE: 126 MMHG

## 2025-03-24 DIAGNOSIS — S82.831A CLOSED FRACTURE OF PROXIMAL END OF RIGHT FIBULA, UNSPECIFIED FRACTURE MORPHOLOGY, INITIAL ENCOUNTER: Primary | ICD-10-CM

## 2025-03-24 PROCEDURE — 73630 X-RAY EXAM OF FOOT: CPT

## 2025-03-24 PROCEDURE — 99283 EMERGENCY DEPT VISIT LOW MDM: CPT | Performed by: EMERGENCY MEDICINE

## 2025-03-24 PROCEDURE — 73562 X-RAY EXAM OF KNEE 3: CPT

## 2025-03-24 PROCEDURE — 73590 X-RAY EXAM OF LOWER LEG: CPT

## 2025-03-24 RX ORDER — ACETAMINOPHEN 500 MG
1000 TABLET ORAL ONCE
Status: COMPLETED | OUTPATIENT
Start: 2025-03-24 | End: 2025-03-24

## 2025-03-24 RX ORDER — KETOROLAC TROMETHAMINE 30 MG/ML
60 INJECTION, SOLUTION INTRAMUSCULAR; INTRAVENOUS ONCE
Status: DISCONTINUED | OUTPATIENT
Start: 2025-03-24 | End: 2025-03-24 | Stop reason: HOSPADM

## 2025-03-24 RX ADMIN — ACETAMINOPHEN 1000 MG: 500 TABLET, FILM COATED ORAL at 05:50

## 2025-03-24 NOTE — DISCHARGE INSTRUCTIONS
As we discussed, you have a proximal right fibula fracture.  Wear your knee immobilizer and use crutches.  Follow-up with orthopedics.  Return to the emergency department before then for any concerning symptoms, worsening symptoms or new concerns.

## 2025-03-24 NOTE — ED PROVIDER NOTES
EMERGENCY DEPARTMENT ENCOUNTER    Pt Name: Sarina Bush  MRN: [id number]  Pt :   1975  Room Number:    Date of encounter:  3/24/2025  PCP: Provider, No Known  ED Provider: Taco Ward MD    Historian: Patient      HPI:  Chief Complaint: Fall, right leg pain        Context: Sarina Bush is a 50 y.o. female who presents to the ED c/o fall and right leg pain.  Patient says that on Friday she was vacuuming her car when she tripped over the vacuum hose and hit her right knee.  She says since then she has had increased pain to the right knee, right tibia-fibula and right foot.  She says the pain sometimes radiates to her right groin.  She says she did hit her head but did not lose consciousness.  No nausea or vomiting.  No anti-coagulant or anti-platelet use.  Patient has been ambulatory since the fall and has been bearing weight to the right lower extremity.      PAST MEDICAL HISTORY  Past Medical History:   Diagnosis Date    Ankle sprain     Bursitis of hip     CTS (carpal tunnel syndrome)     Dislocation of finger     Several times as a kid....    Fracture, finger     Fracture, humerus     Hip arthrosis     Low back strain     Lumbosacral disc disease     Rotator cuff syndrome     Wrist sprain          PAST SURGICAL HISTORY  Past Surgical History:   Procedure Laterality Date    BACK SURGERY      discectomy    CHOLECYSTECTOMY      INCISION AND DRAINAGE OF WOUND Right 2024    Procedure: INCISION DRAINAGE RIGHT FINGER;  Surgeon: Taco Humphreys MD;  Location: [facility];  Service: Orthopedics;  Laterality: Right;    TRIGGER POINT INJECTION      TUBAL ABDOMINAL LIGATION           FAMILY HISTORY  Family History   Problem Relation Age of Onset    Scoliosis Mother         She had this all her life    Osteoporosis Paternal Grandmother         Osteoporosis    Broken bones Brother         Leg thigh area bone football i believe    Cancer Paternal Uncle         Had to have masectomy on  one breast a while back    Dislocations Brother     Cancer Maternal Grandmother         Leukemia    Cancer Paternal Aunt         Breast cancer    Cancer Paternal Aunt         Cervical cacer         SOCIAL HISTORY  Social History     Socioeconomic History    Marital status:    Tobacco Use    Smoking status: Every Day     Current packs/day: 0.50     Average packs/day: 0.5 packs/day for 30.9 years (15.5 ttl pk-yrs)     Types: Cigarettes     Start date: 6/5/1994     Passive exposure: Current    Smokeless tobacco: Never    Tobacco comments:     Have smoked since i graduated from high school   Vaping Use    Vaping status: Former   Substance and Sexual Activity    Alcohol use: No    Drug use: Yes     Types: Marijuana    Sexual activity: Yes     Partners: Male     Birth control/protection: None, Tubal ligation         ALLERGIES  Codeine        REVIEW OF SYSTEMS    All systems reviewed and negative except for those discussed in HPI.       PHYSICAL EXAM    I have reviewed the triage vital signs and nursing notes.    ED Triage Vitals [03/24/25 0501]   Temp Heart Rate Resp BP SpO2   98 °F (36.7 °C) 78 16 139/87 99 %      Temp src Heart Rate Source Patient Position BP Location FiO2 (%)   Oral Monitor Sitting Left arm --         General: no acute distress, well-appearing, non-toxic  Skin: Abrasion to the right knee and dorsum of the right foot.  Head: normocephalic, atraumatic.   Eyes: Pupils equally round and reactive to light.  Nose: normal nasal mucosa, no visible deformity.  Mouth: moist mucous membranes.  Neck: supple.  No midline cervical spine tenderness to palpation.  Chest: no retractions, no visible deformity  Cardiovascular: Regular rate and rhythm.  Abdomen: soft, non-tender, non-distended. No rebound tenderness, no guarding.  No peritonitis.  Extremities: no cyanosis or edema.  Dorsalis pedis pulses not palpable bilaterally but are palpable.  Posterior tibial pulses are dopplerable.  Abrasion to the anterior  right knee and dorsum of the right foot.  Tenderness palpation about the right knee and right foot.  No tenderness palpation of the right hip or right femur. No tenderness at the right ankle.  Neuro:  alert and oriented x3, no focal neurological deficits.  GCS 15.  Psych:  appropriate mood and behavior.        LAB RESULTS  No results found for this or any previous visit (from the past 24 hours).    If labs were ordered, I independently reviewed the results and considered them in treating the patient.  See medical decision making discussion section for my interpretation of lab results.        RADIOLOGY  No Radiology Exams Resulted Within Past 24 Hours    I ordered and independently reviewed the above noted radiographic studies.  See radiologist's dictation for official interpretation.    Per my independent reading:      Plain film of the right knee demonstrates proximal medial fibula fracture without significant displacement based on my independent reading.    Plain film of the right tibia and fibula shows proximal medial right non-displaced fibula fracture based on my independent reading.    Plain film of the right foot negative for acute osseous abnormality based on my independent reading.            PROCEDURES    Procedures    No orders to display       MEDICATIONS GIVEN IN ER    Medications   ketorolac (TORADOL) injection 60 mg (60 mg Intramuscular Not Given 3/24/25 0550)   acetaminophen (TYLENOL) tablet 1,000 mg (1,000 mg Oral Given 3/24/25 0550)         MEDICAL DECISION MAKING, PROGRESS, and CONSULTS    All labs, if obtained, have been independently reviewed by me.  All radiology studies, if obtained, have been reviewed by me and the radiologist dictating the report.  All EKG's, if obtained, have been independently viewed and interpreted by me/my attending physician.      Discussion below represents my analysis of pertinent findings related to patient's condition, differential diagnosis, treatment plan and  final disposition.                         Differential diagnosis:    Differential includes fracture, dislocation, abrasion, laceration, other acute emergency.    Medical Decision Making Discussion:    Vitals reviewed and are normal.    Plain film of the right tibia and fibula demonstrates non-displaced proximal right fibula fracture based on my independent reading.  She will placed in a knee immobilizer and given crutches.  Her outpatient orthopedic follow-up ordered.    Regarding inability to palpate or dorsalis pedis pulses bilaterally this is likely secondary to chronic peripheral vascular disease as she has a longstanding smoking history.  She was referred to primary care for this.  She does have dopplerable bilateral posterior tibial pulses.    Patient instructed to return to the emergency department before then for any concerning symptoms, worsening symptoms or new concerns.    Additional sources:    - External (non-ED) record review: ID note from May 2024 documenting septic arthritis about right fifth finger.    Shared Decision Making:  After my consideration of clinical presentation and any laboratory/radiology studies obtained, I discussed the findings with the patient/patient representative who is in agreement with the treatment plan and the final disposition.   Risks and benefits of discharge and/or observation/admission were discussed.    Orders placed during this visit:  Orders Placed This Encounter   Procedures    [facility] 08. Knee Immobilizer (), 14. Crutches (); Right; Right sided injury    XR Knee 3 View Right    XR Tibia Fibula 2 View Right    XR Foot 3+ View Right    Ambulatory Referral to Orthopedic Surgery (All except Pad)    Ambulatory Referral to Internal Medicine    Obtain & Apply The Following- Lower extremity; Knee immobilizer       AS OF 06:10 EDT VITALS:    BP - 126/78  HR - 78  TEMP - 98 °F (36.7 °C) (Oral)  O2 SATS - 96%                  DIAGNOSIS  Final diagnoses:    Closed fracture of proximal end of right fibula, unspecified fracture morphology, initial encounter         DISPOSITION  Discharge      Please note that portions of this document were completed with voice recognition software.        Taco Ward MD  03/24/25 [id number]

## (undated) DEVICE — CYSTO/BLADDER IRRIGATION SET, REGULATING CLAMP

## (undated) DEVICE — BNDG,ELSTC,MATRIX,STRL,4"X5YD,LF,HOOK&LP: Brand: MEDLINE

## (undated) DEVICE — SUT ETHLN 4/0 PS2 18IN 1667H

## (undated) DEVICE — BLANKT WARM LOWR/BDY 100X120CM

## (undated) DEVICE — SURGUARD3 SAFETY HYPODERMIC NEEDLE: Brand: SURGUARD3

## (undated) DEVICE — SYRINGE,CONTROL,LL,FINGER,GRIP: Brand: MEDLINE INDUSTRIES, INC.

## (undated) DEVICE — PK EXTREM UPPR 10

## (undated) DEVICE — GLV SURG DERMASSURE GRN LF PF 8.0

## (undated) DEVICE — BANDAGE,GAUZE,BULKEE II,4.5"X4.1YD,STRL: Brand: MEDLINE

## (undated) DEVICE — GLV SURG PREMIERPRO MIC LTX PF SZ7.5 BRN

## (undated) DEVICE — DISPOSABLE BIPOLAR FORCEPS 4" (10.2CM) JEWELERS, STRAIGHT 0.4MM TIP AND 12 FT. (3.6M) CABLE: Brand: KIRWAN

## (undated) DEVICE — DRESSING,GAUZE,XEROFORM,CURAD,1"X8",ST: Brand: CURAD